# Patient Record
Sex: FEMALE | Race: BLACK OR AFRICAN AMERICAN | Employment: UNEMPLOYED | URBAN - METROPOLITAN AREA
[De-identification: names, ages, dates, MRNs, and addresses within clinical notes are randomized per-mention and may not be internally consistent; named-entity substitution may affect disease eponyms.]

---

## 2018-07-16 ENCOUNTER — HOSPITAL ENCOUNTER (EMERGENCY)
Age: 33
Discharge: HOME | End: 2018-07-16
Attending: EMERGENCY MEDICINE
Payer: SELF-PAY

## 2018-07-16 VITALS
BODY MASS INDEX: 23.92 KG/M2 | TEMPERATURE: 98.3 F | OXYGEN SATURATION: 99 % | DIASTOLIC BLOOD PRESSURE: 70 MMHG | SYSTOLIC BLOOD PRESSURE: 122 MMHG | RESPIRATION RATE: 16 BRPM | HEIGHT: 62 IN | WEIGHT: 130 LBS | HEART RATE: 78 BPM

## 2018-07-16 DIAGNOSIS — B96.89 BV (BACTERIAL VAGINOSIS): Primary | ICD-10-CM

## 2018-07-16 DIAGNOSIS — N76.0 BV (BACTERIAL VAGINOSIS): Primary | ICD-10-CM

## 2018-07-16 LAB
ANION GAP SERPL CALC-SCNC: 10 MMOL/L (ref 7–16)
BASOPHILS # BLD: 0.1 K/UL (ref 0–0.2)
BASOPHILS NFR BLD: 1 % (ref 0–2)
BUN SERPL-MCNC: 13 MG/DL (ref 6–23)
CALCIUM SERPL-MCNC: 8.9 MG/DL (ref 8.3–10.4)
CHLORIDE SERPL-SCNC: 106 MMOL/L (ref 98–107)
CO2 SERPL-SCNC: 24 MMOL/L (ref 21–32)
CREAT SERPL-MCNC: 0.79 MG/DL (ref 0.6–1)
DIFFERENTIAL METHOD BLD: ABNORMAL
EOSINOPHIL # BLD: 0.1 K/UL (ref 0–0.8)
EOSINOPHIL NFR BLD: 2 % (ref 0.5–7.8)
ERYTHROCYTE [DISTWIDTH] IN BLOOD BY AUTOMATED COUNT: 14.3 % (ref 11.9–14.6)
GLUCOSE SERPL-MCNC: 109 MG/DL (ref 65–100)
HCG UR QL: NEGATIVE
HCT VFR BLD AUTO: 34.9 % (ref 35.8–46.3)
HGB BLD-MCNC: 11.4 G/DL (ref 11.7–15.4)
IMM GRANULOCYTES # BLD: 0 K/UL (ref 0–0.5)
IMM GRANULOCYTES NFR BLD AUTO: 0 % (ref 0–5)
LIPASE SERPL-CCNC: 379 U/L (ref 73–393)
LYMPHOCYTES # BLD: 1.9 K/UL (ref 0.5–4.6)
LYMPHOCYTES NFR BLD: 33 % (ref 13–44)
MCH RBC QN AUTO: 24.2 PG (ref 26.1–32.9)
MCHC RBC AUTO-ENTMCNC: 32.7 G/DL (ref 31.4–35)
MCV RBC AUTO: 74.1 FL (ref 79.6–97.8)
MONOCYTES # BLD: 0.3 K/UL (ref 0.1–1.3)
MONOCYTES NFR BLD: 6 % (ref 4–12)
NEUTS SEG # BLD: 3.4 K/UL (ref 1.7–8.2)
NEUTS SEG NFR BLD: 58 % (ref 43–78)
PLATELET # BLD AUTO: 239 K/UL (ref 150–450)
PMV BLD AUTO: 10.8 FL (ref 10.8–14.1)
POTASSIUM SERPL-SCNC: 3.6 MMOL/L (ref 3.5–5.1)
RBC # BLD AUTO: 4.71 M/UL (ref 4.05–5.25)
SERVICE CMNT-IMP: NORMAL
SODIUM SERPL-SCNC: 140 MMOL/L (ref 136–145)
WBC # BLD AUTO: 5.8 K/UL (ref 4.3–11.1)
WET PREP GENITAL: NORMAL

## 2018-07-16 PROCEDURE — 85025 COMPLETE CBC W/AUTO DIFF WBC: CPT | Performed by: EMERGENCY MEDICINE

## 2018-07-16 PROCEDURE — 83690 ASSAY OF LIPASE: CPT | Performed by: EMERGENCY MEDICINE

## 2018-07-16 PROCEDURE — 80048 BASIC METABOLIC PNL TOTAL CA: CPT | Performed by: EMERGENCY MEDICINE

## 2018-07-16 PROCEDURE — 99284 EMERGENCY DEPT VISIT MOD MDM: CPT | Performed by: EMERGENCY MEDICINE

## 2018-07-16 PROCEDURE — 87210 SMEAR WET MOUNT SALINE/INK: CPT | Performed by: EMERGENCY MEDICINE

## 2018-07-16 PROCEDURE — 87491 CHLMYD TRACH DNA AMP PROBE: CPT | Performed by: EMERGENCY MEDICINE

## 2018-07-16 PROCEDURE — 81025 URINE PREGNANCY TEST: CPT

## 2018-07-16 RX ORDER — METRONIDAZOLE 500 MG/1
500 TABLET ORAL 2 TIMES DAILY WITH MEALS
Qty: 14 TAB | Refills: 0 | Status: SHIPPED | OUTPATIENT
Start: 2018-07-16 | End: 2018-07-23

## 2018-07-17 NOTE — ED TRIAGE NOTES
Pt states that she was bleeding yesterday and it is not time for her cycle states that she is not seeing the blood today but she also did not put a tampon in today

## 2018-07-17 NOTE — DISCHARGE INSTRUCTIONS
Bacterial Vaginosis: Care Instructions  Your Care Instructions    Bacterial vaginosis is a type of vaginal infection. It is caused by excess growth of certain bacteria that are normally found in the vagina. Symptoms can include itching, swelling, pain when you urinate or have sex, and a gray or yellow discharge with a \"fishy\" odor. It is not considered an infection that is spread through sexual contact. Although symptoms can be annoying and uncomfortable, bacterial vaginosis does not usually cause other health problems. However, if you have it while you are pregnant, it can cause complications. While the infection may go away on its own, most doctors use antibiotics to treat it. You may have been prescribed pills or vaginal cream. With treatment, bacterial vaginosis usually clears up in 5 to 7 days. Follow-up care is a key part of your treatment and safety. Be sure to make and go to all appointments, and call your doctor if you are having problems. It's also a good idea to know your test results and keep a list of the medicines you take. How can you care for yourself at home? · Take your antibiotics as directed. Do not stop taking them just because you feel better. You need to take the full course of antibiotics. · Do not eat or drink anything that contains alcohol if you are taking metronidazole (Flagyl). · Keep using your medicine if you start your period. Use pads instead of tampons while using a vaginal cream or suppository. Tampons can absorb the medicine. · Wear loose cotton clothing. Do not wear nylon and other materials that hold body heat and moisture close to the skin. · Do not scratch. Relieve itching with a cold pack or a cool bath. · Do not wash your vaginal area more than once a day. Use plain water or a mild, unscented soap. Do not douche. When should you call for help?   Watch closely for changes in your health, and be sure to contact your doctor if:    · You have unexpected vaginal bleeding.     · You have a fever.     · You have new or increased pain in your vagina or pelvis.     · You are not getting better after 1 week.     · Your symptoms return after you finish the course of your medicine. Where can you learn more? Go to http://gonzález-krupa.info/. Christiano Herr in the search box to learn more about \"Bacterial Vaginosis: Care Instructions. \"  Current as of: October 6, 2017  Content Version: 11.7  © 9995-5279 [employer]. Care instructions adapted under license by [employer] (which disclaims liability or warranty for this information). If you have questions about a medical condition or this instruction, always ask your healthcare professional. [address] any warranty or liability for your use of this information.

## 2018-07-17 NOTE — ED PROVIDER NOTES
HPI Comments: 79-year-old female presents with possible vaginal bleeding. She frequently uses tampons to treat vaginal discharge. She stopped doing this a month ago at request of her girlfriend. She felt \"wet\" yesterday so inserted a tampon. She states it became \"stuck. \"  Her girlfriend removed it yesterday and she noticed some brown discharge that appeared like blood on the tampon. She has had some lower abdominal cramping with nausea and decreased appetite since that time. Denies any current vaginal discharge or bleeding. LMP 2 weeks ago was normal.  Denies fevers or chills. The history is provided by the patient. No past medical history on file. No past surgical history on file. No family history on file. Social History Social History  Marital status:  Spouse name: N/A  
 Number of children: N/A  
 Years of education: N/A Occupational History  Not on file. Social History Main Topics  Smoking status: Not on file  Smokeless tobacco: Not on file  Alcohol use Not on file  Drug use: Not on file  Sexual activity: Not on file Other Topics Concern  Not on file Social History Narrative ALLERGIES: Review of patient's allergies indicates no known allergies. Review of Systems Constitutional: Negative for chills and fever. HENT: Negative for hearing loss. Eyes: Negative for visual disturbance. Respiratory: Negative for cough and shortness of breath. Cardiovascular: Negative for chest pain and palpitations. Gastrointestinal: Positive for abdominal pain and nausea. Negative for diarrhea and vomiting. Genitourinary: Positive for pelvic pain, vaginal bleeding and vaginal discharge. Negative for dysuria and vaginal pain. Musculoskeletal: Negative for back pain. Skin: Negative for rash. Neurological: Negative for weakness and headaches. Psychiatric/Behavioral: Negative for confusion.   
 
 
Vitals:  
 07/16/18 2007  
BP: 123/71 Pulse: 77 Resp: 16 Temp: 98.5 °F (36.9 °C) SpO2: 99% Weight: 59 kg (130 lb) Height: 5' 2\" (1.575 m) Physical Exam  
Constitutional: She appears well-developed and well-nourished. HENT:  
Head: Normocephalic and atraumatic. Right Ear: External ear normal.  
Left Ear: External ear normal.  
Nose: Nose normal.  
Mouth/Throat: Oropharynx is clear and moist.  
Eyes: Conjunctivae are normal. Pupils are equal, round, and reactive to light. Neck: Normal range of motion. Neck supple. Cardiovascular: Regular rhythm, normal heart sounds and intact distal pulses. Pulmonary/Chest: Effort normal and breath sounds normal. No respiratory distress. She has no wheezes. Abdominal: Soft. Bowel sounds are normal. She exhibits no distension. There is tenderness in the suprapubic area. There is no rigidity, no rebound and no guarding. Genitourinary: Cervix exhibits discharge. Cervix exhibits no motion tenderness and no friability. No bleeding in the vagina. No foreign body in the vagina. Vaginal discharge found. Musculoskeletal: Normal range of motion. She exhibits no edema. Neurological: She is alert. Skin: Skin is warm and dry. Psychiatric: Judgment normal.  
Nursing note and vitals reviewed. MDM Number of Diagnoses or Management Options Diagnosis management comments: Parts of this document were created using dragon voice recognition software. The chart has been reviewed but errors may still be present. 10:34 PM 
Wet prep shows clue cells. We'll prescribe Flagyl. No bleeding. I discussed the results of all labs, procedures, radiographs, and treatments with the patient and available family. Treatment plan is agreed upon and the patient is ready for discharge. Questions about treatment in the ED and differential diagnosis of presenting condition were answered.   Patient was given verbal discharge instructions including, but not limited to, importance of returning to the emergency department for any concern of worsening or continued symptoms. Instructions were given to follow up with a primary care provider or specialist within 1-2 days. Adverse effects of medications, if prescribed, were discussed and patient was advised to refrain from significant physical activity until followed up by primary care physician and to not drive or operate heavy machinery after taking any sedating substances. Amount and/or Complexity of Data Reviewed Clinical lab tests: ordered and reviewed (Results for orders placed or performed during the hospital encounter of 07/16/18 
-WET PREP Result                                            Value                         Ref Range Special Requests:                                 NO SPECIAL REQUESTS Wet prep MANY 
 CLUE CELLS PRESENT Wet prep                                           3 TO 5 WBC'S PER HPF Wet prep                                          NO YEAST SEEN Wet prep                                          NO TRICHOMONAS SEEN                                     
-CBC WITH AUTOMATED DIFF Result                                            Value                         Ref Range WBC                                               5.8                           4.3 - 11.1 K/uL           
     RBC                                               4.71                          4.05 - 5.25 M/uL HGB                                               11.4 (L)                      11.7 - 15.4 g/dL HCT                                               34.9 (L)                      35.8 - 46.3 %      MCV 74.1 (L)                      79.6 - 97.8 FL            
     MCH                                               24.2 (L)                      26.1 - 32.9 PG            
     MCHC                                              32.7                          31.4 - 35.0 g/dL RDW                                               14.3                          11.9 - 14.6 % PLATELET                                          239                           150 - 450 K/uL MPV                                               10.8                          10.8 - 14.1 FL            
     DF                                                AUTOMATED NEUTROPHILS                                       58                            43 - 78 % LYMPHOCYTES                                       33                            13 - 44 % MONOCYTES                                         6                             4.0 - 12.0 % EOSINOPHILS                                       2                             0.5 - 7.8 % BASOPHILS                                         1                             0.0 - 2.0 % IMMATURE GRANULOCYTES                             0                             0.0 - 5.0 %               
     ABS. NEUTROPHILS                                  3.4                           1.7 - 8.2 K/UL            
     ABS. LYMPHOCYTES                                  1.9                           0.5 - 4.6 K/UL            
     ABS. MONOCYTES                                    0.3                           0.1 - 1.3 K/UL            
     ABS. EOSINOPHILS                                  0.1                           0.0 - 0.8 K/UL            
     ABS.  BASOPHILS                                    0.1 0.0 - 0.2 K/UL            
     ABS. IMM. GRANS.                                  0.0                           0.0 - 0.5 K/UL            
-METABOLIC PANEL, BASIC Result                                            Value                         Ref Range Sodium                                            140                           136 - 145 mmol/L Potassium                                         3.6                           3.5 - 5.1 mmol/L Chloride                                          106                           98 - 107 mmol/L           
     CO2                                               24                            21 - 32 mmol/L Anion gap                                         10                            7 - 16 mmol/L Glucose                                           109 (H)                       65 - 100 mg/dL BUN                                               13                            6 - 23 MG/DL Creatinine                                        0.79                          0.6 - 1.0 MG/DL           
     GFR est AA                                        >60                           >60 ml/min/1.73m2 GFR est non-AA                                    >60                           >60 ml/min/1.73m2 Calcium                                           8.9                           8.3 - 10.4 MG/DL          
-LIPASE Result                                            Value                         Ref Range Lipase                                            379                           73 - 393 U/L              
-HCG URINE, QL. - POC Result                                            Value                         Ref Range      Pregnancy test,urine (POC) NEGATIVE                      NEG                       
) 
 
 
 
 
ED Course Procedures

## 2018-07-17 NOTE — ED NOTES
I have reviewed discharge instructions with the patient. The patient verbalized understanding. Patient left ED via Discharge Method: ambulatory to Home with friend. Opportunity for questions and clarification provided. Patient given 0 scripts. To continue your aftercare when you leave the hospital, you may receive an automated call from our care team to check in on how you are doing. This is a free service and part of our promise to provide the best care and service to meet your aftercare needs.  If you have questions, or wish to unsubscribe from this service please call [phone]. Thank you for Choosing our [facility] Emergency Department.

## 2018-07-20 LAB
C TRACH RRNA SPEC QL NAA+PROBE: NEGATIVE
N GONORRHOEA RRNA SPEC QL NAA+PROBE: NEGATIVE
SPECIMEN SOURCE: NORMAL